# Patient Record
Sex: MALE | Race: WHITE | Employment: FULL TIME | URBAN - METROPOLITAN AREA
[De-identification: names, ages, dates, MRNs, and addresses within clinical notes are randomized per-mention and may not be internally consistent; named-entity substitution may affect disease eponyms.]

---

## 2017-04-11 ENCOUNTER — HOSPITAL ENCOUNTER (OUTPATIENT)
Dept: OTHER | Age: 27
Discharge: HOME | End: 2017-04-11

## 2020-11-07 ENCOUNTER — HOSPITAL ENCOUNTER (EMERGENCY)
Age: 30
Discharge: HOME | End: 2020-11-07
Attending: EMERGENCY MEDICINE
Payer: COMMERCIAL

## 2020-11-07 VITALS
RESPIRATION RATE: 18 BRPM | HEART RATE: 92 BPM | OXYGEN SATURATION: 97 % | WEIGHT: 190 LBS | DIASTOLIC BLOOD PRESSURE: 79 MMHG | HEIGHT: 73 IN | TEMPERATURE: 98.1 F | SYSTOLIC BLOOD PRESSURE: 153 MMHG | BODY MASS INDEX: 25.18 KG/M2

## 2020-11-07 PROCEDURE — 99283 EMERGENCY DEPT VISIT LOW MDM: CPT

## 2020-11-07 NOTE — ED PROVIDER NOTES
[address] [facility] PROVIDER NOTE    Patient Identification  Pt Name: Adán Templeton  MRN: [id number]  [place] 1990  Date of evaluation: 11/7/2020  Provider: Marli Rosenberg MD  PCP: Referring Not In System (Inactive)    Chief Complaint  Facial Burn (Patient presents to ER with complaints of right ear burn while at work; [address] )      HPI  History provided by patient   This is a 30 y.o. male who presents to the ED for facial burn. Occurred just prior to arrival.  Works as a  and was wearing protective equipment. Denies any other injury besides burn. No smoke inhalation. No trouble breathing. Has pain in right ear only    ROS  10 systems reviewed, pertinent positives/negatives per HPI otherwise noted to be negative. I have reviewed the following nursing documentation:  Allergies: Patient has no known allergies. Past medical history: History reviewed. No pertinent past medical history. Past surgical history: History reviewed. No pertinent surgical history. Home medications:   Previous Medications    No medications on file       Social history:  reports that he has never smoked. He has never used smokeless tobacco.    Family history:  History reviewed. No pertinent family history. Exam  ED Triage Vitals [11/07/20 0121]   BP Temp Temp Source Pulse Resp SpO2 Height Weight   (!) 153/79 98.1 °F (36.7 °C) Oral 92 18 97 % 6' 1\" (1.854 m) 190 lb (86.2 kg)     Nursing note and vitals reviewed. Constitutional: In no acute distress  HENT:      Head: Normocephalic      Ears: External ears normal.      Nose: Nose normal.     Mouth: Membrane mucosa moist   Eyes: No discharge. Neck: Supple. Trachea midline. Cardiovascular: Regular rate. Warm extremities  Pulmonary/Chest: Effort normal. No respiratory distress. No wheezes. Abdominal: Soft. No distension. Nontender  : Deferred  Rectal: Deferred   Musculoskeletal: Moves all extremities.  No gross deformity. Neurological: Alert and oriented. Face symmetric. Speech is clear. Skin: Warm and dry. Right ear burn, blistering area about 1-1/2 cm large with surrounding first-degree burn, sparing the rest of the face  Psychiatric: Normal mood and affect. Behavior is normal.    Procedures      Radiology  No orders to display       Labs  No results found for this visit on 11/07/20. Screenings           MDM and ED Course  Patient is a 28-year-old male who presents with facial burn while working as a . No evidence of inhalation injury. Saturating well on room air. Tetanus up-to-date. Burns were cleaned and antibiotic ointment applied. Burn follow-up was provided to the patient. Pain is under control. Area of second-degree burn is quite small but given possibility of poor cosmesis/infection, was referred to burn center. All questions answered and return precautions given. (EMP MDM)    [unfilled]    Final Impression  1. Facial burn, second degree, initial encounter    2. Facial burn, first degree, initial encounter        Blood pressure (!) 153/79, pulse 92, temperature 98.1 °F (36.7 °C), temperature source Oral, resp. rate 18, height 6' 1\" (1.854 m), weight 190 lb (86.2 kg), SpO2 97 %. Disposition:  DISPOSITION Decision To Discharge 11/07/2020 02:16:09 AM      Patient Referrals:  No follow-up provider specified. Discharge Medications:  New Prescriptions    No medications on file       Discontinued Medications:  Discontinued Medications    No medications on file       This chart was generated using the [address] [place] [id number]  [employer] system. I created this record but it may contain dictation errors given the limitations of this technology.         Jovi Turner MD  11/07/20 0996

## 2020-11-07 NOTE — ED NOTES
Pt examined by Dr. David Perez doing screening. Pt to be discharged home.      Claudette Alcon, RN  11/07/20 5753

## 2022-05-21 ENCOUNTER — HOSPITAL ENCOUNTER (EMERGENCY)
Age: 32
Discharge: HOME | End: 2022-05-21
Payer: COMMERCIAL

## 2022-05-21 VITALS
HEIGHT: 73 IN | BODY MASS INDEX: 26.51 KG/M2 | OXYGEN SATURATION: 98 % | WEIGHT: 200 LBS | SYSTOLIC BLOOD PRESSURE: 159 MMHG | HEART RATE: 95 BPM | TEMPERATURE: 98.3 F | DIASTOLIC BLOOD PRESSURE: 93 MMHG | RESPIRATION RATE: 18 BRPM
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
## 2022-05-21 DIAGNOSIS — S61.239A HYPODERMIC NEEDLESTICK INJURY OF FINGER: Primary | ICD-10-CM

## 2022-05-21 LAB
HBV SURFACE AB TITR SER: 51.13 MIU/ML
HEPATITIS C ANTIBODY INTERPRETATION: NORMAL

## 2022-05-21 PROCEDURE — 86701 HIV-1ANTIBODY: CPT

## 2022-05-21 PROCEDURE — 86702 HIV-2 ANTIBODY: CPT

## 2022-05-21 PROCEDURE — 86803 HEPATITIS C AB TEST: CPT

## 2022-05-21 PROCEDURE — 86706 HEP B SURFACE ANTIBODY: CPT

## 2022-05-21 PROCEDURE — 99282 EMERGENCY DEPT VISIT SF MDM: CPT

## 2022-05-21 PROCEDURE — 36415 COLL VENOUS BLD VENIPUNCTURE: CPT

## 2022-05-21 PROCEDURE — 87390 HIV-1 AG IA: CPT

## 2022-05-21 ASSESSMENT — PAIN - FUNCTIONAL ASSESSMENT: PAIN_FUNCTIONAL_ASSESSMENT: NONE - DENIES PAIN

## 2022-05-21 NOTE — ED PROVIDER NOTES
[facility]        Pt Name: Jose Juan Valencia  MRN: [id number]  [place] 1990  Date of evaluation: 5/21/2022  Provider: Sujey Sandy PA-C  PCP: Referring Not In System (Inactive)  Note Started: 12:29 PM EDT       [facility]. I have evaluated this patient. My supervising physician was available for consultation. CHIEF COMPLAINT       Chief Complaint   Patient presents with    Body Fluid Exposure     Pt works for [address] [place] [address], was transporting a patient, and was stuck with blood sugar lancet to right index finger. HISTORY OF PRESENT ILLNESS   (Location, Timing/Onset, Context/Setting, Quality, Duration, Modifying Factors, Severity, Associated Signs and Symptoms)  Note limiting factors. Chief Complaint: David Carvalho is a 28 y.o. male who presents to the emergency department after accidentally sticking himself in the right index finger with a lancet. Patient works for [employer] was transporting a patient here and accidentally stuck himself in the finger after checking the patient. He states that this did not bleeding he was only able to express a small amount of his own blood after the needlestick. He had his last tetanus vaccination last year and states he is up-to-date on his hepatitis B vaccine. He has no history of hepatitis C or HIV. He denies any pain. Denies any other injury. Nursing Notes were all reviewed and agreed with or any disagreements were addressed in the HPI. REVIEW OF SYSTEMS    (2-9 systems for level 4, 10 or more for level 5)     Review of Systems    Positives and Pertinent negatives as per HPI. Except as noted above in the ROS, all other systems were reviewed and negative. PAST MEDICAL HISTORY   History reviewed. No pertinent past medical history. SURGICAL HISTORY   History reviewed. No pertinent surgical history.       CURRENTMEDICATIONS       There are no discharge medications for this patient. ALLERGIES     Patient has no known allergies. FAMILYHISTORY     History reviewed. No pertinent family history. SOCIAL HISTORY       Social History     Tobacco Use    Smoking status: Never Smoker    Smokeless tobacco: Never Used   Substance Use Topics    Alcohol use: Not on file    Drug use: Not on file       SCREENINGS    [place] Coma Scale  Eye Opening: Spontaneous  Best Verbal Response: Oriented  Best Motor Response: Obeys commands  [place] Coma Scale Score: 15        PHYSICAL EXAM    (up to 7 for level 4, 8 or more for level 5)     ED Triage Vitals [05/21/22 1217]   BP Temp Temp Source Pulse Resp SpO2 Height Weight   (!) 159/93 98.3 °F (36.8 °C) Oral 95 18 98 % 6' 1\" (1.854 m) 200 lb (90.7 kg)       Physical Exam  Vitals and nursing note reviewed. Constitutional:       Appearance: He is well-developed. He is not diaphoretic. HENT:      Head: Atraumatic. Nose: Nose normal.   Eyes:      General:         Right eye: No discharge. Left eye: No discharge. Musculoskeletal:      Cervical back: Normal range of motion. Comments: Very small puncture wound noted from needlestick injury on the pad of the right index finger. No current bleeding. Full range of motion of right index finger. Skin:     General: Skin is warm and dry. Findings: No erythema or rash. Neurological:      Mental Status: He is alert and oriented to person, place, and time. Cranial Nerves: No cranial nerve deficit. Psychiatric:         Behavior: Behavior normal.         DIAGNOSTIC RESULTS   LABS:    Labs Reviewed   HIV SCREEN   HEPATITIS C ANTIBODY   HEPATITIS B SURFACE ANTIBODY       When ordered only abnormal lab results are displayed. All other labs were within normal range or not returned as of this dictation. EKG:  When ordered, EKG's are interpreted by the Emergency Department Physician in the absence of a cardiologist.  Please see their note for interpretation of EKG. RADIOLOGY:   Non-plain film images such as CT, Ultrasound and MRI are read by the radiologist. Plain radiographic images are visualized and preliminarily interpreted by the ED Provider with the below findings:        Interpretation per the Radiologist below, if available at the time of this note:    No orders to display     No results found. PROCEDURES   Unless otherwise noted below, none     Procedures    CRITICAL CARE TIME       CONSULTS:  None      EMERGENCY DEPARTMENT COURSE and DIFFERENTIAL DIAGNOSIS/MDM:   Vitals:    Vitals:    05/21/22 1217   BP: (!) 159/93   Pulse: 95   Resp: 18   Temp: 98.3 °F (36.8 °C)   TempSrc: Oral   SpO2: 98%   Weight: 200 lb (90.7 kg)   Height: 6' 1\" (1.854 m)       Patient was given the following medications:  Medications - No data to display      Is this patient to be included in the SEP-1 Core Measure due to severe sepsis or septic shock? No   Exclusion criteria - the patient is NOT to be included for SEP-1 Core Measure due to: Infection is not suspected    Patient presented after an accidental fingerstick. After discussion with postexposure prophylaxis patient does not want antiviral oral treatment. Does sound very low risk given that he was barely even able to express a small amount of blood from the fingerstick injury. We were able to test the source patient and will follow up with the results. I had discussed this with Dr. Gaston Gordon who placed the order on the source patient given that they were a patient here also. Can follow-up if the patient is HIV positive. Patient is up-to-date on hepatitis B vaccine and tetanus. FINAL IMPRESSION      1.  Hypodermic needlestick injury of finger          DISPOSITION/PLAN   DISPOSITION Decision To Discharge 05/21/2022 12:27:21 PM      PATIENT REFERRED TO:  *[facility]  [address]   [place] [address]  [phone]    Schedule an appointment as soon as possible for a visit   For re-check    [facility] Emergency Department  [address] [place] [address]  [phone]    As needed      DISCHARGE MEDICATIONS:  There are no discharge medications for this patient. DISCONTINUED MEDICATIONS:  There are no discharge medications for this patient.              (Please note that portions of this note were completed with a voice recognition program.  Efforts were made to edit the dictations but occasionally words are mis-transcribed.)    Sunita Trejo PA-C (electronically signed)            Sunita Trejo PA-C  05/21/22 8635

## 2022-05-22 LAB
HIV AG/AB: NORMAL
HIV ANTIGEN: NORMAL
HIV-1 ANTIBODY: NORMAL
HIV-2 AB: NORMAL